# Patient Record
Sex: MALE | Race: WHITE | NOT HISPANIC OR LATINO | Employment: FULL TIME | URBAN - METROPOLITAN AREA
[De-identification: names, ages, dates, MRNs, and addresses within clinical notes are randomized per-mention and may not be internally consistent; named-entity substitution may affect disease eponyms.]

---

## 2019-04-04 ENCOUNTER — OFFICE VISIT (OUTPATIENT)
Dept: INTERNAL MEDICINE | Facility: CLINIC | Age: 64
End: 2019-04-04
Payer: COMMERCIAL

## 2019-04-04 VITALS
SYSTOLIC BLOOD PRESSURE: 110 MMHG | OXYGEN SATURATION: 95 % | HEIGHT: 68 IN | WEIGHT: 206.13 LBS | DIASTOLIC BLOOD PRESSURE: 72 MMHG | HEART RATE: 57 BPM | TEMPERATURE: 99 F | BODY MASS INDEX: 31.24 KG/M2

## 2019-04-04 DIAGNOSIS — Z12.9 SCREENING FOR CANCER: ICD-10-CM

## 2019-04-04 DIAGNOSIS — Z00.00 ROUTINE MEDICAL EXAM: Primary | ICD-10-CM

## 2019-04-04 PROCEDURE — 99386 PREV VISIT NEW AGE 40-64: CPT | Mod: S$GLB,,, | Performed by: FAMILY MEDICINE

## 2019-04-04 PROCEDURE — 99386 PR PREVENTIVE VISIT,NEW,40-64: ICD-10-PCS | Mod: S$GLB,,, | Performed by: FAMILY MEDICINE

## 2019-04-04 PROCEDURE — 99999 PR PBB SHADOW E&M-NEW PATIENT-LVL III: ICD-10-PCS | Mod: PBBFAC,,, | Performed by: FAMILY MEDICINE

## 2019-04-04 PROCEDURE — 99999 PR PBB SHADOW E&M-NEW PATIENT-LVL III: CPT | Mod: PBBFAC,,, | Performed by: FAMILY MEDICINE

## 2019-04-04 NOTE — PROGRESS NOTES
Patient Name: Emigdio Thornton  MRN: [id number]  Patient Class: [facility]- [facility]               SUBJECTIVE:      Chief Complaint: well visit     History of Present Illness:  Patient is a 63 y.o. male with chronic history of BPH presents to [facility] for annual exam.  He was diagnosed with BPH in 2017, had CT, and bx, not currently on any medication and followed with urology.  He reports slow stream which has not changed, and denies other urinary symptoms.  No fever, no night sweat, no chills, no changes in appetite.  He reports a 10lb weight loss in the last 3 months, but was expected with decrease sugar intake and increase exercise.  However, his weight loss concerns his wife, which brings him in.  No significant family history of cardiac vascular or diabetes, currently working with healthy diet and exercise. >40 pack years of smoking history increase his risk of lung cancer and bladder cancer.      Review of Systems:  Review of Systems   Constitutional: Negative for activity change, appetite change, chills, diaphoresis, fatigue, fever and unexpected weight change.   HENT: Negative for congestion, ear pain, hearing loss, sinus pressure, trouble swallowing and voice change.    Eyes: Negative for photophobia, pain and visual disturbance.   Respiratory: Negative for apnea, cough, shortness of breath and wheezing.    Cardiovascular: Negative for chest pain, palpitations and leg swelling.   Gastrointestinal: Negative for abdominal distention, abdominal pain, constipation, diarrhea, nausea and vomiting.   Endocrine: Negative for cold intolerance, heat intolerance, polydipsia, polyphagia and polyuria.   Genitourinary: Negative for difficulty urinating, dysuria, frequency and urgency.   Musculoskeletal: Negative for back pain, gait problem, neck pain and neck stiffness.   Allergic/Immunologic: Negative for environmental allergies, food allergies and immunocompromised state.   Neurological: Negative  for dizziness, tremors, weakness, light-headedness, numbness and headaches.   Hematological: Negative for adenopathy. Does not bruise/bleed easily.   Psychiatric/Behavioral: Negative for confusion, decreased concentration, dysphoric mood and sleep disturbance.              Past Medical History:   Diagnosis Date    BPH (benign prostatic hyperplasia) 2017    PARDEEP (obstructive sleep apnea)       on C-pap         History reviewed. No pertinent surgical history.     Review of patient's allergies indicates:  No Known Allergies     Prior to Admission medications    Not on File               Family History   Problem Relation Age of Onset    Prostate cancer Paternal Grandfather           Social History      Tobacco Use    Smoking status: Former Smoker       Packs/day: 1.00       Years: 40.00       Pack years: 40.00       Types: Cigars   Substance Use Topics    Alcohol use: Not on file    Drug use: Not on file         OBJECTIVE:      Vital Signs (Most Recent):  Temp: 98.9 °F (37.2 °C) (04/04/19 0938)  Pulse: (!) 57 (04/04/19 0938)  BP: 110/72 (04/04/19 0938)  SpO2: 95 % (04/04/19 0938)           Wt Readings from Last 1 Encounters:   04/04/19 0938 93.5 kg (206 lb 2.1 oz)      Physical Exam   Constitutional: He is oriented to person, place, and time. He appears well-developed and well-nourished. No distress.   HENT:   Head: Normocephalic and atraumatic.   Mouth/Throat: No oropharyngeal exudate.   Eyes: Pupils are equal, round, and reactive to light. EOM are normal.   Neck: Normal range of motion. Neck supple. No thyromegaly present.   Cardiovascular: Normal rate, regular rhythm and normal heart sounds. Exam reveals no friction rub.   No murmur heard.  Pulmonary/Chest: Effort normal and breath sounds normal. No respiratory distress. He has no wheezes. He exhibits no tenderness.   Abdominal: Soft. Bowel sounds are normal. He exhibits no distension and no mass. There is no tenderness. There is no rebound and no guarding. No  hernia.   Musculoskeletal: Normal range of motion.   Lymphadenopathy:     He has no cervical adenopathy.   Neurological: He is alert and oriented to person, place, and time.   Skin: Skin is warm. Capillary refill takes less than 2 seconds.   Psychiatric: He has a normal mood and affect. His behavior is normal. Judgment and thought content normal.      ASSESSMENT/PLAN:      Emigdio Thornton 63 y.o. M presented for annual well exam  1. Annual   - patient had colonoscopy 6 months ago, and found polyps, followed by GI  - patient had dental and vision screening   - CBC, CMP, lipid panel, fasting BG, HbA1C, TSH, fT4, CT lung screening (because of significant smoking history ~40pack/year)  - one time Hepatitis C screening  - follow PSA for elevated risk of prostate cancer.   - /75  - have discussed future vaccination, diet, exercise.      2. BPH  - follow up with PSA  - patient follow up with urologist seen since 2017     Regina Darby  Medical Student M4        Interviewed and evaluated pt and agree with plan as above. Pt required to go to [facility] for labs.   Josafat Dunham MD

## 2019-04-04 NOTE — MEDICAL/APP STUDENT
[facility] Internal Medicine                                                                                                                                                       [facility] Internal Medicine     Patient Name: Emigdio Thornton  MRN: [id number]  Patient Class: OP- [facility]     SUBJECTIVE:     Chief Complaint: well visit    History of Present Illness:  Patient is a 63 y.o. male with chronic history of BPH presents to [facility] for annual exam.  He was diagnosed with BPH in 2017, had CT, and bx, not currently on any medication and followed with urology.  He reports slow stream which has not changed, and denies other urinary symptoms.  No fever, no night sweat, no chills, no changes in appetite.  He reports a 10lb weight loss in the last 3 months, but was expected with decrease sugar intake and increase exercise.  However, his weight loss concerns his wife, which brings him in.  No significant family history of cardiac vascular or diabetes, currently working with healthy diet and exercise. >40 pack years of smoking history increase his risk of lung cancer and bladder cancer.     Review of Systems:  Review of Systems   Constitutional: Negative for activity change, appetite change, chills, diaphoresis, fatigue, fever and unexpected weight change.   HENT: Negative for congestion, ear pain, hearing loss, sinus pressure, trouble swallowing and voice change.    Eyes: Negative for photophobia, pain and visual disturbance.   Respiratory: Negative for apnea, cough, shortness of breath and wheezing.    Cardiovascular: Negative for chest pain, palpitations and leg swelling.   Gastrointestinal: Negative for abdominal distention, abdominal pain, constipation, diarrhea, nausea and vomiting.   Endocrine: Negative for cold intolerance, heat intolerance, polydipsia, polyphagia and polyuria.   Genitourinary: Negative for difficulty urinating, dysuria, frequency and urgency.   Musculoskeletal:  Negative for back pain, gait problem, neck pain and neck stiffness.   Allergic/Immunologic: Negative for environmental allergies, food allergies and immunocompromised state.   Neurological: Negative for dizziness, tremors, weakness, light-headedness, numbness and headaches.   Hematological: Negative for adenopathy. Does not bruise/bleed easily.   Psychiatric/Behavioral: Negative for confusion, decreased concentration, dysphoric mood and sleep disturbance.       Past Medical History:   Diagnosis Date    BPH (benign prostatic hyperplasia) 2017    PARDEEP (obstructive sleep apnea)     on C-pap       History reviewed. No pertinent surgical history.    Review of patient's allergies indicates:  No Known Allergies    Prior to Admission medications    Not on File       Family History   Problem Relation Age of Onset    Prostate cancer Paternal Grandfather        Social History     Tobacco Use    Smoking status: Former Smoker     Packs/day: 1.00     Years: 40.00     Pack years: 40.00     Types: Cigars   Substance Use Topics    Alcohol use: Not on file    Drug use: Not on file        OBJECTIVE:     Vital Signs (Most Recent):  Temp: 98.9 °F (37.2 °C) (04/04/19 0938)  Pulse: (!) 57 (04/04/19 0938)  BP: 110/72 (04/04/19 0938)  SpO2: 95 % (04/04/19 0938)     Wt Readings from Last 1 Encounters:   04/04/19 0938 93.5 kg (206 lb 2.1 oz)     Physical Exam   Constitutional: He is oriented to person, place, and time. He appears well-developed and well-nourished. No distress.   HENT:   Head: Normocephalic and atraumatic.   Mouth/Throat: No oropharyngeal exudate.   Eyes: Pupils are equal, round, and reactive to light. EOM are normal.   Neck: Normal range of motion. Neck supple. No thyromegaly present.   Cardiovascular: Normal rate, regular rhythm and normal heart sounds. Exam reveals no friction rub.   No murmur heard.  Pulmonary/Chest: Effort normal and breath sounds normal. No respiratory distress. He has no wheezes. He exhibits no  tenderness.   Abdominal: Soft. Bowel sounds are normal. He exhibits no distension and no mass. There is no tenderness. There is no rebound and no guarding. No hernia.   Musculoskeletal: Normal range of motion.   Lymphadenopathy:     He has no cervical adenopathy.   Neurological: He is alert and oriented to person, place, and time.   Skin: Skin is warm. Capillary refill takes less than 2 seconds.   Psychiatric: He has a normal mood and affect. His behavior is normal. Judgment and thought content normal.     ASSESSMENT/PLAN:     Emigdio Thornton 63 y.o. M presented for annual well exam  1. Annual   - patient had colonoscopy 6 months ago, and found polyps, followed by GI  - patient had dental and vision screening   - CBC, CMP, lipid panel, fasting BG, HbA1C, TSH, fT4, CT lung screening (because of significant smoking history ~40pack/year)  - one time Hepatitis C screening  - follow PSA for elevated risk of prostate cancer.   - /75  - have discussed future vaccination, diet, exercise.     2. BPH  - follow up with PSA  - patient follow up with urologist seen since 2017    Regina Darby  Medical Student M4

## 2019-04-05 ENCOUNTER — TELEPHONE (OUTPATIENT)
Dept: INTERNAL MEDICINE | Facility: CLINIC | Age: 64
End: 2019-04-05

## 2019-04-05 DIAGNOSIS — R79.89 ABNORMAL TSH: Primary | ICD-10-CM

## 2019-04-05 LAB
ALBUMIN SERPL-MCNC: 4.5 G/DL (ref 3.6–5.1)
ALBUMIN/GLOB SERPL: 2.1 (CALC) (ref 1–2.5)
ALP SERPL-CCNC: 57 U/L (ref 40–115)
ALT SERPL-CCNC: 34 U/L (ref 9–46)
AST SERPL-CCNC: 16 U/L (ref 10–35)
BASOPHILS # BLD AUTO: 37 CELLS/UL (ref 0–200)
BASOPHILS NFR BLD AUTO: 0.6 %
BILIRUB SERPL-MCNC: 0.7 MG/DL (ref 0.2–1.2)
BUN SERPL-MCNC: 11 MG/DL (ref 7–25)
BUN/CREAT SERPL: NORMAL (CALC) (ref 6–22)
CALCIUM SERPL-MCNC: 9.7 MG/DL (ref 8.6–10.3)
CHLORIDE SERPL-SCNC: 104 MMOL/L (ref 98–110)
CHOLEST SERPL-MCNC: 210 MG/DL
CHOLEST/HDLC SERPL: 5.5 (CALC)
CO2 SERPL-SCNC: 30 MMOL/L (ref 20–32)
CREAT SERPL-MCNC: 0.79 MG/DL (ref 0.7–1.25)
EOSINOPHIL # BLD AUTO: 229 CELLS/UL (ref 15–500)
EOSINOPHIL NFR BLD AUTO: 3.7 %
ERYTHROCYTE [DISTWIDTH] IN BLOOD BY AUTOMATED COUNT: 12.2 % (ref 11–15)
GFRSERPLBLD MDRD-ARVRAT: 97 ML/MIN/1.73M2
GLOBULIN SER CALC-MCNC: 2.1 G/DL (CALC) (ref 1.9–3.7)
GLUCOSE SERPL-MCNC: 77 MG/DL (ref 65–99)
HBA1C MFR BLD: 5.3 % OF TOTAL HGB
HCT VFR BLD AUTO: 45.4 % (ref 38.5–50)
HCV AB S/CO SERPL IA: 0.02
HCV AB SERPL QL IA: NORMAL
HDLC SERPL-MCNC: 38 MG/DL
HGB BLD-MCNC: 15.4 G/DL (ref 13.2–17.1)
LDLC SERPL CALC-MCNC: 134 MG/DL (CALC)
LYMPHOCYTES # BLD AUTO: 2077 CELLS/UL (ref 850–3900)
LYMPHOCYTES NFR BLD AUTO: 33.5 %
MCH RBC QN AUTO: 31.4 PG (ref 27–33)
MCHC RBC AUTO-ENTMCNC: 33.9 G/DL (ref 32–36)
MCV RBC AUTO: 92.5 FL (ref 80–100)
MONOCYTES # BLD AUTO: 422 CELLS/UL (ref 200–950)
MONOCYTES NFR BLD AUTO: 6.8 %
NEUTROPHILS # BLD AUTO: 3435 CELLS/UL (ref 1500–7800)
NEUTROPHILS NFR BLD AUTO: 55.4 %
NONHDLC SERPL-MCNC: 172 MG/DL (CALC)
PLATELET # BLD AUTO: 189 THOUSAND/UL (ref 140–400)
PMV BLD REES-ECKER: 11.3 FL (ref 7.5–12.5)
POTASSIUM SERPL-SCNC: 4 MMOL/L (ref 3.5–5.3)
PROT SERPL-MCNC: 6.6 G/DL (ref 6.1–8.1)
PSA SERPL-MCNC: 8.2 NG/ML
RBC # BLD AUTO: 4.91 MILLION/UL (ref 4.2–5.8)
SODIUM SERPL-SCNC: 142 MMOL/L (ref 135–146)
T4 FREE SERPL-MCNC: 1.1 NG/DL (ref 0.8–1.8)
TRIGL SERPL-MCNC: 236 MG/DL
TSH SERPL-ACNC: 5.06 MIU/L (ref 0.4–4.5)
WBC # BLD AUTO: 6.2 THOUSAND/UL (ref 3.8–10.8)

## 2019-04-05 NOTE — TELEPHONE ENCOUNTER
Lab results reviewed with pt. Pt stated that he saw his urologist a few days before his last appt with , and he is awaiting the blood test. Pt verbalized understanding regarding the lab results, and the advise giving about his diet. Pt scheduled for repeat labs on next Friday.                ----- Message from Josafat Dunham MD sent at 4/5/2019  2:51 PM CDT -----  PSA is greater than 8 which is pretty high. He has an outside urologist. When is he seeing him again?   Also, chol is moderately high. Eat high-fiber, low-fat diet and repeat chol  Panel in six mo.  One of his thyroid tests was abnormal. I want to repeat these tests non-fasting next week. Thank you. Moises

## 2019-04-08 DIAGNOSIS — R79.89 ABNORMAL TSH: Primary | ICD-10-CM

## 2019-04-13 LAB
T4 FREE SERPL-MCNC: 1.2 NG/DL (ref 0.8–1.8)
TSH SERPL-ACNC: 4.18 MIU/L (ref 0.4–4.5)

## 2024-10-28 ENCOUNTER — HOSPITAL ENCOUNTER (EMERGENCY)
Facility: HOSPITAL | Age: 69
Discharge: HOME | End: 2024-10-28
Attending: EMERGENCY MEDICINE
Payer: COMMERCIAL

## 2024-10-28 VITALS
TEMPERATURE: 99 F | OXYGEN SATURATION: 95 % | DIASTOLIC BLOOD PRESSURE: 80 MMHG | HEIGHT: 68 IN | WEIGHT: 195 LBS | HEART RATE: 74 BPM | RESPIRATION RATE: 20 BRPM | SYSTOLIC BLOOD PRESSURE: 158 MMHG | BODY MASS INDEX: 29.55 KG/M2

## 2024-10-28 DIAGNOSIS — T24.201A PARTIAL THICKNESS BURN OF RIGHT LOWER EXTREMITY, INITIAL ENCOUNTER: Primary | ICD-10-CM

## 2024-10-28 PROCEDURE — 99284 EMERGENCY DEPT VISIT MOD MDM: CPT

## 2024-10-28 RX ORDER — MUPIROCIN 20 MG/G
OINTMENT TOPICAL 3 TIMES DAILY
Qty: 15 G | Refills: 0 | Status: SHIPPED | OUTPATIENT
Start: 2024-10-28

## 2024-10-28 RX ORDER — CEPHALEXIN 500 MG/1
500 CAPSULE ORAL 4 TIMES DAILY
Qty: 20 CAPSULE | Refills: 0 | Status: SHIPPED | OUTPATIENT
Start: 2024-10-28 | End: 2024-11-02
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
